# Patient Record
Sex: MALE | Race: BLACK OR AFRICAN AMERICAN | NOT HISPANIC OR LATINO | Employment: UNEMPLOYED | URBAN - METROPOLITAN AREA
[De-identification: names, ages, dates, MRNs, and addresses within clinical notes are randomized per-mention and may not be internally consistent; named-entity substitution may affect disease eponyms.]

---

## 2021-08-02 ENCOUNTER — HISTORICAL (OUTPATIENT)
Dept: ADMINISTRATIVE | Facility: HOSPITAL | Age: 36
End: 2021-08-02

## 2021-08-02 LAB
HBV SURFACE AB SER-ACNC: 94.1 MIU/ML
HBV SURFACE AB SERPL IA-ACNC: REACTIVE M[IU]/ML
HBV SURFACE AG SERPL QL IA: NONREACTIVE
HCV AB SERPL QL IA: NONREACTIVE
HIV 1+2 AB+HIV1 P24 AG SERPL QL IA: REACTIVE

## 2021-08-12 ENCOUNTER — HISTORICAL (OUTPATIENT)
Dept: WOUND CARE | Facility: HOSPITAL | Age: 36
End: 2021-08-12

## 2021-10-05 ENCOUNTER — HISTORICAL (OUTPATIENT)
Dept: ADMINISTRATIVE | Facility: HOSPITAL | Age: 36
End: 2021-10-05

## 2021-10-05 LAB
ABS NEUT (OLG): 2.32 X10(3)/MCL (ref 2.1–9.2)
ALBUMIN SERPL-MCNC: 4.1 GM/DL (ref 3.5–5)
ALBUMIN/GLOB SERPL: 1.2 RATIO (ref 1.1–2)
ALP SERPL-CCNC: 96 UNIT/L (ref 40–150)
ALT SERPL-CCNC: 47 UNIT/L (ref 0–55)
AMPHET UR QL SCN: NEGATIVE
APPEARANCE, UA: CLEAR
AST SERPL-CCNC: 36 UNIT/L (ref 5–34)
BACTERIA #/AREA URNS AUTO: ABNORMAL /HPF
BARBITURATE SCN PRESENT UR: NEGATIVE
BASOPHILS # BLD AUTO: 0 X10(3)/MCL (ref 0–0.2)
BASOPHILS NFR BLD AUTO: 0 %
BENZODIAZ UR QL SCN: NEGATIVE
BILIRUB SERPL-MCNC: 0.2 MG/DL
BILIRUB UR QL STRIP: NEGATIVE
BILIRUBIN DIRECT+TOT PNL SERPL-MCNC: 0.1 MG/DL (ref 0–0.5)
BILIRUBIN DIRECT+TOT PNL SERPL-MCNC: 0.1 MG/DL (ref 0–0.8)
BUN SERPL-MCNC: 14.9 MG/DL (ref 8.9–20.6)
CALCIUM SERPL-MCNC: 9.9 MG/DL (ref 8.4–10.2)
CANNABINOIDS UR QL SCN: NEGATIVE
CD3+CD4+ CELLS # SPEC: 822 UNIT/L (ref 589–1505)
CD3+CD4+ CELLS NFR BLD: 47 % (ref 31–59)
CHLORIDE SERPL-SCNC: 107 MMOL/L (ref 98–107)
CHOLEST SERPL-MCNC: 217 MG/DL
CHOLEST/HDLC SERPL: 6 {RATIO} (ref 0–5)
CO2 SERPL-SCNC: 26 MMOL/L (ref 22–29)
COCAINE UR QL SCN: NEGATIVE
COLOR UR: NORMAL
CREAT SERPL-MCNC: 1.54 MG/DL (ref 0.73–1.18)
DEPRECATED CALCIDIOL+CALCIFEROL SERPL-MC: 20.6 NG/ML (ref 30–80)
EOSINOPHIL # BLD AUTO: 0.2 X10(3)/MCL (ref 0–0.9)
EOSINOPHIL NFR BLD AUTO: 5 %
ERYTHROCYTE [DISTWIDTH] IN BLOOD BY AUTOMATED COUNT: 12.9 % (ref 11.5–14.5)
EST. AVERAGE GLUCOSE BLD GHB EST-MCNC: 116.9 MG/DL
GLOBULIN SER-MCNC: 3.3 GM/DL (ref 2.4–3.5)
GLUCOSE (UA): NEGATIVE
GLUCOSE SERPL-MCNC: 97 MG/DL (ref 74–100)
HAV AB SER QL IA: REACTIVE
HAV IGM SERPL QL IA: NONREACTIVE
HBA1C MFR BLD: 5.7 %
HBV CORE AB SERPL QL IA: NONREACTIVE
HBV CORE IGM SERPL QL IA: NONREACTIVE
HBV SURFACE AB SER-ACNC: 61.24 MIU/ML
HBV SURFACE AB SERPL IA-ACNC: REACTIVE M[IU]/ML
HBV SURFACE AG SERPL QL IA: NONREACTIVE
HCT VFR BLD AUTO: 43.1 % (ref 40–51)
HCV AB SERPL QL IA: NONREACTIVE
HDLC SERPL-MCNC: 37 MG/DL (ref 35–60)
HGB BLD-MCNC: 14.7 GM/DL (ref 13.5–17.5)
HGB UR QL STRIP: NEGATIVE
HYALINE CASTS #/AREA URNS LPF: ABNORMAL /LPF
IMM GRANULOCYTES # BLD AUTO: 0.01 10*3/UL
IMM GRANULOCYTES NFR BLD AUTO: 0 %
KETONES UR QL STRIP: NEGATIVE
LDLC SERPL CALC-MCNC: 132 MG/DL (ref 50–140)
LEUKOCYTE ESTERASE UR QL STRIP: NEGATIVE
LYMPHOCYTES # BLD AUTO: 1.7 X10(3)/MCL (ref 0.6–4.6)
LYMPHOCYTES # BLD AUTO: NORMAL UNIT/L (ref 1260–5520)
LYMPHOCYTES NFR BLD AUTO: 35 %
LYMPHOCYTES NFR LN MANUAL: NORMAL % (ref 28–48)
MCH RBC QN AUTO: 30.7 PG (ref 26–34)
MCHC RBC AUTO-ENTMCNC: 34.1 GM/DL (ref 31–37)
MCV RBC AUTO: 90 FL (ref 80–100)
MONOCYTES # BLD AUTO: 0.6 X10(3)/MCL (ref 0.1–1.3)
MONOCYTES NFR BLD AUTO: 13 %
NEG CONT SPOT COUNT: NORMAL
NEUTROPHILS # BLD AUTO: 2.32 X10(3)/MCL (ref 2.1–9.2)
NEUTROPHILS NFR BLD AUTO: 47 %
NITRITE UR QL STRIP: NEGATIVE
NRBC BLD AUTO-RTO: 0 % (ref 0–0.2)
OPIATES UR QL SCN: NEGATIVE
PANEL A SPOT COUNT: 0
PANEL B SPOT COUNT: 0
PCP UR QL: NEGATIVE
PH UR STRIP.AUTO: 6.5 [PH] (ref 3–11)
PH UR STRIP: 6.5 [PH] (ref 4.5–8)
PLATELET # BLD AUTO: 273 X10(3)/MCL (ref 130–400)
PMV BLD AUTO: 10.2 FL (ref 7.4–10.4)
POS CONT SPOT COUNT: NORMAL
POTASSIUM SERPL-SCNC: 3.6 MMOL/L (ref 3.5–5.1)
PROT SERPL-MCNC: 7.4 GM/DL (ref 6.4–8.3)
PROT UR QL STRIP: NEGATIVE
RBC # BLD AUTO: 4.79 X10(6)/MCL (ref 4.5–5.9)
RBC #/AREA URNS AUTO: ABNORMAL /HPF
RPR SER QL: REACTIVE
SODIUM SERPL-SCNC: 139 MMOL/L (ref 136–145)
SP GR UR STRIP: 1.01 (ref 1–1.03)
SQUAMOUS #/AREA URNS LPF: ABNORMAL /LPF
T PALLIDUM AB SER QL: REACTIVE
T-SPOT.TB: NORMAL
TRIGL SERPL-MCNC: 238 MG/DL (ref 34–140)
TSH SERPL-ACNC: 1.76 UIU/ML (ref 0.35–4.94)
UROBILINOGEN UR STRIP-ACNC: NORMAL
VLDLC SERPL CALC-MCNC: 48 MG/DL
WBC # BLD AUTO: NORMAL /MM3 (ref 4500–11500)
WBC # SPEC AUTO: 5 X10(3)/MCL (ref 4.5–11)
WBC #/AREA URNS AUTO: ABNORMAL /HPF

## 2021-11-05 ENCOUNTER — HISTORICAL (OUTPATIENT)
Dept: ADMINISTRATIVE | Facility: HOSPITAL | Age: 36
End: 2021-11-05

## 2021-11-05 LAB
BUN SERPL-MCNC: 15.7 MG/DL (ref 8.9–20.6)
CALCIUM SERPL-MCNC: 9.8 MG/DL (ref 8.7–10.5)
CHLORIDE SERPL-SCNC: 108 MMOL/L (ref 98–107)
CHOLEST SERPL-MCNC: 224 MG/DL
CHOLEST/HDLC SERPL: 7 {RATIO} (ref 0–5)
CO2 SERPL-SCNC: 25 MMOL/L (ref 22–29)
CREAT SERPL-MCNC: 1.65 MG/DL (ref 0.73–1.18)
CREAT/UREA NIT SERPL: 10
GLUCOSE SERPL-MCNC: 94 MG/DL (ref 74–100)
HDLC SERPL-MCNC: 33 MG/DL (ref 35–60)
LDLC SERPL CALC-MCNC: 136 MG/DL (ref 50–140)
POTASSIUM SERPL-SCNC: 4.2 MMOL/L (ref 3.5–5.1)
SODIUM SERPL-SCNC: 141 MMOL/L (ref 136–145)
TRIGL SERPL-MCNC: 273 MG/DL (ref 34–140)
VLDLC SERPL CALC-MCNC: 55 MG/DL

## 2021-11-23 ENCOUNTER — HISTORICAL (OUTPATIENT)
Dept: ADMINISTRATIVE | Facility: HOSPITAL | Age: 36
End: 2021-11-23

## 2021-11-23 ENCOUNTER — HISTORICAL (OUTPATIENT)
Dept: LAB | Facility: HOSPITAL | Age: 36
End: 2021-11-23

## 2021-11-23 LAB
BUN SERPL-MCNC: 15.1 MG/DL (ref 8.9–20.6)
CALCIUM SERPL-MCNC: 9.8 MG/DL (ref 8.7–10.5)
CHLORIDE SERPL-SCNC: 107 MMOL/L (ref 98–107)
CO2 SERPL-SCNC: 26 MMOL/L (ref 22–29)
CREAT SERPL-MCNC: 1.82 MG/DL (ref 0.73–1.18)
CREAT/UREA NIT SERPL: 8
GLUCOSE SERPL-MCNC: 78 MG/DL (ref 74–100)
POTASSIUM SERPL-SCNC: 3.9 MMOL/L (ref 3.5–5.1)
SODIUM SERPL-SCNC: 142 MMOL/L (ref 136–145)

## 2021-12-30 ENCOUNTER — HISTORICAL (OUTPATIENT)
Dept: ADMINISTRATIVE | Facility: HOSPITAL | Age: 36
End: 2021-12-30

## 2021-12-30 LAB
ABS NEUT (OLG): 2.25 X10(3)/MCL (ref 2.1–9.2)
BASOPHILS # BLD AUTO: 0 X10(3)/MCL (ref 0–0.2)
BASOPHILS NFR BLD AUTO: 0 %
BUN SERPL-MCNC: 15.3 MG/DL (ref 8.9–20.6)
CALCIUM SERPL-MCNC: 9.4 MG/DL (ref 8.7–10.5)
CHLORIDE SERPL-SCNC: 108 MMOL/L (ref 98–107)
CO2 SERPL-SCNC: 24 MMOL/L (ref 22–29)
CREAT SERPL-MCNC: 1.67 MG/DL (ref 0.73–1.18)
CREAT/UREA NIT SERPL: 9
EOSINOPHIL # BLD AUTO: 0 X10(3)/MCL (ref 0–0.9)
EOSINOPHIL NFR BLD AUTO: 0 %
ERYTHROCYTE [DISTWIDTH] IN BLOOD BY AUTOMATED COUNT: 13.5 % (ref 11.5–14.5)
GLUCOSE SERPL-MCNC: 78 MG/DL (ref 74–100)
HCT VFR BLD AUTO: 44.2 % (ref 40–51)
HGB BLD-MCNC: 14.8 GM/DL (ref 13.5–17.5)
IMM GRANULOCYTES # BLD AUTO: 0.02 10*3/UL
IMM GRANULOCYTES NFR BLD AUTO: 0 %
LYMPHOCYTES # BLD AUTO: 2.1 X10(3)/MCL (ref 0.6–4.6)
LYMPHOCYTES NFR BLD AUTO: 39 %
MCH RBC QN AUTO: 30.7 PG (ref 26–34)
MCHC RBC AUTO-ENTMCNC: 33.5 GM/DL (ref 31–37)
MCV RBC AUTO: 91.7 FL (ref 80–100)
MONOCYTES # BLD AUTO: 1.1 X10(3)/MCL (ref 0.1–1.3)
MONOCYTES NFR BLD AUTO: 20 %
NEUTROPHILS # BLD AUTO: 2.25 X10(3)/MCL (ref 2.1–9.2)
NEUTROPHILS NFR BLD AUTO: 41 %
NRBC BLD AUTO-RTO: 0 % (ref 0–0.2)
PLATELET # BLD AUTO: 259 X10(3)/MCL (ref 130–400)
PMV BLD AUTO: 10.4 FL (ref 7.4–10.4)
POTASSIUM SERPL-SCNC: 3.8 MMOL/L (ref 3.5–5.1)
RBC # BLD AUTO: 4.82 X10(6)/MCL (ref 4.5–5.9)
SODIUM SERPL-SCNC: 141 MMOL/L (ref 136–145)
WBC # SPEC AUTO: 5.5 X10(3)/MCL (ref 4.5–11)

## 2022-01-18 ENCOUNTER — HISTORICAL (OUTPATIENT)
Dept: RADIOLOGY | Facility: HOSPITAL | Age: 37
End: 2022-01-18

## 2022-01-26 ENCOUNTER — HISTORICAL (OUTPATIENT)
Dept: SURGERY | Facility: HOSPITAL | Age: 37
End: 2022-01-26

## 2022-04-11 ENCOUNTER — HISTORICAL (OUTPATIENT)
Dept: ADMINISTRATIVE | Facility: HOSPITAL | Age: 37
End: 2022-04-11

## 2022-04-27 VITALS
HEIGHT: 73 IN | OXYGEN SATURATION: 100 % | DIASTOLIC BLOOD PRESSURE: 92 MMHG | BODY MASS INDEX: 33.75 KG/M2 | WEIGHT: 254.63 LBS | SYSTOLIC BLOOD PRESSURE: 142 MMHG

## 2022-05-05 NOTE — HISTORICAL [facility]
This is a historical note converted from [employer]. Formatting and pictures may have been removed.  Please reference [employer] for original formatting and attached multimedia. Chief Complaint  Abscess to R upper back  History of Present Illness  36 y/o male with a back?lump ?x 3 months, causing pain with positions when he sleep, getting bigger. Pt is at the [facility]. PMHx HIV. At one point?it ?will need? to be remove. No abscess.  Review of Systems  see nurse notes  Physical Exam  Vitals & Measurements  T:?36.8? ?C (Oral)? HR:?92(Peripheral)? RR:?18? BP:?132/86? SpO2:?99%?  BMI:?27.33?  Back Big lump ,look like a lipoma.  Assessment/Plan  Lipoma of back?D17.1  ?Incision/Wounds  ?1. Other: Right Upper Back Lipoma?- last charted: 08/12/2021 14:00  ?? ??Assessment Done By?- Wound Care Team  ?? ??Abnormality Pattern?- Raised  ?? ??Pressure Point?- None  ?? ??Dressing Type?- None  ?? ??Cleansing?- Cleaned with normal saline  ?? ??Length?- 11.5 cm  ?? ??Width?- 13.5 cm  ?? ??Exudate Amount?- None  ?? ??Edge?- Approximated  ?? ??Surrounding Tissue Color?- Normal  ?? ??Surrounding Tissue Condition?- Intact  ?Pt examined and notes review above. The mass look like a lipoma. We explain to the patient that probably need to be removed and if the lipoma get bigger or cause more problems or get infected to let us know but as now?the nurse at the [facility] ?will continue observing the lump. RTC as needed.  Ordered:  Wound Care Team Treatment, 08/12/21 14:17:00 CDT, Stop date 08/12/21 14:17:00 CDT, RTC if needed. Pt to f/u with the nurse at the [facility] .  ?   Problem List/Past Medical History  Ongoing  No qualifying data  Historical  No qualifying data  Medications  Keflex 500 mg oral capsule, See Instructions  Triumeq oral tablet, 1 tab(s), Oral, Daily  Tylenol Caplet 500 mg oral tablet, See Instructions  Allergies  codeine?(Hives)  sulfa drugs?(Hives)  Health Maintenance  Health Maintenance  ???Pending?(in the next year)  ???  ??OverDue  ??? ? ? ?Alcohol Misuse Screening due??01/02/21??and every 1??year(s)  ??? ??Due?  ??? ? ? ?ADL Screening due??08/12/21??and every 1??year(s)  ??? ? ? ?Tetanus Vaccine due??08/12/21??and every 10??year(s)  ??? ??Due In Future?  ??? ? ? ?Obesity Screening not due until??01/01/22??and every 1??year(s)  ???Satisfied?(in the past 1 year)  ??? ??Satisfied?  ??? ? ? ?Blood Pressure Screening on??08/12/21.??Satisfied by SANTHOSH Gonsalez Shawndala  ??? ? ? ?Body Mass Index Check on??08/12/21.??Satisfied by SANTHOSH Gonsalez Shawndala  ??? ? ? ?Obesity Screening on??08/12/21.??Satisfied by SANTHOSH Gonsalez Shawndala  ?

## 2022-05-05 NOTE — HISTORICAL [facility] C[facility]
This is a historical note converted from [employer]. Formatting and pictures may have been removed.  Please reference [employer] for original formatting and attached multimedia. Chief Complaint  Referral for lipoma/cyst to back has increased in size and causes pain  History of Present Illness  Mr. Morales is a 35 yo male w PMH HIV+, asthma, CKD II. He presents today for removal of?a lipoma?on right posterior neck/upper back?since July 2021 that has progressively increased in size. Pt previously described pain that shoots down his arm and pain that shoots up his neck causing headaches but reports no such symptoms today/recently. He does feel discomfort when changing position in bed at night and when?sleeping on his right arm. He denies changes in vision, nausea, fever, chills, SOB, weight loss. MRI 1/18 shows a lipomatous mass not involving any nerves or muscle. NPO since midnight, not on any blood thinners  Physical Exam  Vitals & Measurements  T:?36.5? ?C (Oral)? HR:?67(Peripheral)? RR:?20? BP:?121/73? SpO2:?99%? WT:?115.2?kg? BMI:?32.7?  General:?well-developed well-nourished in no acute distress  Neck: 12 cm mass on right posterior neck/upper back. Mobile and rubbery mass well circumscribed. No pain on palpation.  Musculoskeletal:?full range of motion of all extremities/spine without limitation or discomfort  Integumentary: no rashes or skin lesions present  Neurologic: cranial nerves intact, no signs of peripheral neurological deficit, motor/sensory function intact  Assessment/Plan  Mr. Morales is a 35 yo male w PMH HIV+, asthma, CKD II. He?is scheduled for removal of?right posterior neck/upper back mass today.  - risks benefits alternatives explained and discussed in detail. all questions answered, pt amenable to proceed, consent signed  ?   Samira Diaz, PGY1  ?   Pt seen in [place] and exhibits a large subcutaneous mass  on posterior neck consistent with lipoma.  Pt aware of planned procedure.  ?   Jeromy Tavares,  MD   Problem List/Past Medical History  Ongoing  HIV disease  Obesity  Historical  No qualifying data  Procedure/Surgical History  Ureter reconstruction (2011)   Medications  Inpatient  cefazolin 2gm/50ml D5W (Premix)  Home  cholecalciferol 2000 intl units (50 mcg) oral capsule, 50 mcg= 1 cap(s), Oral, Daily  Qvar Redihaler 40 mcg/inh inhalation aerosol, 1 puff(s), INH, BID, PRN,? ?Not Taking, Completed Rx  Triumeq oral tablet, 1 tab(s), Oral, Daily, 3 refills  Zyrtec 10 mg oral tablet, 10 mg= 1 tab(s), Oral, Daily  Allergies  Pineapple?(Asthma)  codeine?(Hives)  sulfa drugs?(Hives)  Social History  Abuse/Neglect  No, No, Yes, 01/26/2022  No, 12/30/2021  Alcohol  Past, Beer, Wine, Liquor, 11/05/2021  Substance Use  Past, Cocaine, Marijuana, 11/05/2021  Tobacco  Former smoker, quit more than 30 days ago, No, 01/26/2022  Former smoker, quit more than 30 days ago, No, 12/30/2021  Family History  Asthma.: Father.  Hypertension.: Father.  Thyroid disease: Mother.  Brother: History is unknown  Sister: History is unknown  Immunizations  Vaccine Date Status   tetanus/diphtheria/pertussis, acel(Tdap) 11/05/2021 Given   influenza virus vaccine, inactivated 11/05/2021 Given   hepatitis B vaccine 10/05/2021 Recorded   hepatitis A adult vaccine 10/05/2021 Recorded   COVID-19, vector nr, rS-Ad26 - [facility] 07/18/2021 Recorded

## 2022-05-14 NOTE — OP NOTE
Indication for Surgery  35 yo male w PMH HIV+, asthma, CKD II who presented with a bothersome?right posterior neck/upper back mass and was scheduled for excision.  Preoperative Diagnosis  right posterior neck/upper back mass  Postoperative Diagnosis  same  Operation  excision of right posterior neck/upper back mass  Surgeon(s)  Staff: Jeromy Tavares MD  ?   Candice Osborne MD  Assistant  Bill Kellogg, MS3  Anesthesia  General  Estimated Blood Loss  10cc  Findings  mass size about [id number]  Specimen(s)  mass  Complications  none  Technique  Patient was brought to the operating room and placed in supine position.? General anesthesia was induced and patient was intubated.? He tolerated this well.? Patient was then placed in prone position on the operating room table.? All pressure areas were protected.? Posterior neck/upper back?was prepped and draped in sterile fashion.? A timeout was performed correctly identifying patient?and procedure to be performed.? An elliptical incision was then performed?on the skin?try to protect?his previous tattoos.? Subcutaneous?skin flaps were then?performed?circumferentially around the mass.? Using Bovie electrocautery?the mass was dissected and freed up from all the subcutaneous tissues around it.? The mass appeared to be?fatty?with multiple lobules.? We dissected up to the muscle fascia.? The mass was then?excised using Bovie electrocautery. ?It measured about?9?x 8 x 5 cm.? The mass was then?tagged with silk sutures?long lateral and so a short superior.? The cavity was then irrigated.? Hemostasis was achieved. ?3-0 Vicryl sutures were then placed to approximate the deep tissues?in order to prevent?seroma. ?Deep dermals were then placed?with 3-0 Vicryl sutures.? Marcaine?local anesthetic was placed?in the subcutaneous?tissues.? Skin was then closed with?2-0 nylon sutures in mattress interrupted?fashion. ?A sterile dressing was then placed.? All counts were correct.? Patient  was awoken from anesthesia?and extubated.? He tolerated well the procedure.? Dr. Tavares was present for the procedure.  ?  Candice Osborne MD  [facility] General Surgery PGY-3  ?   This certifies I was present during the entire period between opening and closing of the surgical field.  ?   Jeromy Tavares MD

## 2023-11-13 ENCOUNTER — TELEPHONE (OUTPATIENT)
Dept: INFECTIOUS DISEASES | Facility: CLINIC | Age: 38
End: 2023-11-13
Payer: COMMERCIAL

## 2023-11-13 ENCOUNTER — OFFICE VISIT (OUTPATIENT)
Dept: INFECTIOUS DISEASES | Facility: CLINIC | Age: 38
End: 2023-11-13
Payer: COMMERCIAL

## 2023-11-13 VITALS
RESPIRATION RATE: 16 BRPM | TEMPERATURE: 98 F | BODY MASS INDEX: 36.98 KG/M2 | HEIGHT: 73 IN | HEART RATE: 83 BPM | WEIGHT: 279 LBS | DIASTOLIC BLOOD PRESSURE: 78 MMHG | SYSTOLIC BLOOD PRESSURE: 131 MMHG

## 2023-11-13 DIAGNOSIS — Z11.3 ROUTINE SCREENING FOR STI (SEXUALLY TRANSMITTED INFECTION): ICD-10-CM

## 2023-11-13 DIAGNOSIS — E55.9 VITAMIN D DEFICIENCY: ICD-10-CM

## 2023-11-13 DIAGNOSIS — Z23 IMMUNIZATION DUE: ICD-10-CM

## 2023-11-13 DIAGNOSIS — E78.5 HYPERLIPIDEMIA, UNSPECIFIED HYPERLIPIDEMIA TYPE: ICD-10-CM

## 2023-11-13 DIAGNOSIS — B20 HIV DISEASE: Primary | ICD-10-CM

## 2023-11-13 DIAGNOSIS — A53.9 SYPHILIS: ICD-10-CM

## 2023-11-13 DIAGNOSIS — Z86.19 HISTORY OF SYPHILIS: ICD-10-CM

## 2023-11-13 LAB
ALBUMIN SERPL-MCNC: 4.3 G/DL (ref 3.5–5)
ALBUMIN/GLOB SERPL: 1.2 RATIO (ref 1.1–2)
ALP SERPL-CCNC: 92 UNIT/L (ref 40–150)
ALT SERPL-CCNC: 53 UNIT/L (ref 0–55)
APPEARANCE UR: CLEAR
AST SERPL-CCNC: 32 UNIT/L (ref 5–34)
BACTERIA #/AREA URNS AUTO: ABNORMAL /HPF
BASOPHILS # BLD AUTO: 0.02 X10(3)/MCL
BASOPHILS NFR BLD AUTO: 0.3 %
BILIRUB SERPL-MCNC: 0.3 MG/DL
BILIRUB UR QL STRIP.AUTO: NEGATIVE
BUN SERPL-MCNC: 12.9 MG/DL (ref 8.9–20.6)
C TRACH DNA SPEC QL NAA+PROBE: NOT DETECTED
C TRACH DNA SPEC QL NAA+PROBE: NOT DETECTED
CALCIUM SERPL-MCNC: 9.5 MG/DL (ref 8.4–10.2)
CHLORIDE SERPL-SCNC: 104 MMOL/L (ref 98–107)
CHOLEST SERPL-MCNC: 224 MG/DL
CHOLEST/HDLC SERPL: 7 {RATIO} (ref 0–5)
CO2 SERPL-SCNC: 25 MMOL/L (ref 22–29)
COLOR UR AUTO: COLORLESS
CREAT SERPL-MCNC: 1.54 MG/DL (ref 0.73–1.18)
DEPRECATED CALCIDIOL+CALCIFEROL SERPL-MC: 37.6 NG/ML (ref 30–80)
EOSINOPHIL # BLD AUTO: 0.1 X10(3)/MCL (ref 0–0.9)
EOSINOPHIL NFR BLD AUTO: 1.7 %
ERYTHROCYTE [DISTWIDTH] IN BLOOD BY AUTOMATED COUNT: 12.2 % (ref 11.5–17)
GFR SERPLBLD CREATININE-BSD FMLA CKD-EPI: 58 MLS/MIN/1.73/M2
GLOBULIN SER-MCNC: 3.5 GM/DL (ref 2.4–3.5)
GLUCOSE SERPL-MCNC: 78 MG/DL (ref 74–100)
GLUCOSE UR QL STRIP.AUTO: NORMAL
HAV IGM SERPL QL IA: NONREACTIVE
HBV CORE IGM SERPL QL IA: NONREACTIVE
HBV SURFACE AG SERPL QL IA: NONREACTIVE
HCT VFR BLD AUTO: 48.7 % (ref 42–52)
HCV AB SERPL QL IA: NONREACTIVE
HDLC SERPL-MCNC: 32 MG/DL (ref 35–60)
HGB BLD-MCNC: 16.3 G/DL (ref 14–18)
HYALINE CASTS #/AREA URNS LPF: ABNORMAL /LPF
IMM GRANULOCYTES # BLD AUTO: 0.01 X10(3)/MCL (ref 0–0.04)
IMM GRANULOCYTES NFR BLD AUTO: 0.2 %
KETONES UR QL STRIP.AUTO: NEGATIVE
LDLC SERPL CALC-MCNC: 141 MG/DL (ref 50–140)
LEUKOCYTE ESTERASE UR QL STRIP.AUTO: NEGATIVE
LYMPHOCYTES # BLD AUTO: 2.31 X10(3)/MCL (ref 0.6–4.6)
LYMPHOCYTES NFR BLD AUTO: 39 %
MCH RBC QN AUTO: 30.4 PG (ref 27–31)
MCHC RBC AUTO-ENTMCNC: 33.5 G/DL (ref 33–36)
MCV RBC AUTO: 90.9 FL (ref 80–94)
MONOCYTES # BLD AUTO: 0.64 X10(3)/MCL (ref 0.1–1.3)
MONOCYTES NFR BLD AUTO: 10.8 %
N GONORRHOEA DNA SPEC QL NAA+PROBE: NOT DETECTED
N GONORRHOEA DNA SPEC QL NAA+PROBE: NOT DETECTED
NEUTROPHILS # BLD AUTO: 2.85 X10(3)/MCL (ref 2.1–9.2)
NEUTROPHILS NFR BLD AUTO: 48 %
NITRITE UR QL STRIP.AUTO: NEGATIVE
NRBC BLD AUTO-RTO: 0 %
PH UR STRIP.AUTO: 6.5 [PH]
PLATELET # BLD AUTO: 294 X10(3)/MCL (ref 130–400)
PMV BLD AUTO: 10.4 FL (ref 7.4–10.4)
POTASSIUM SERPL-SCNC: 3.9 MMOL/L (ref 3.5–5.1)
PROT SERPL-MCNC: 7.8 GM/DL (ref 6.4–8.3)
PROT UR QL STRIP.AUTO: NEGATIVE
RBC # BLD AUTO: 5.36 X10(6)/MCL (ref 4.7–6.1)
RBC #/AREA URNS AUTO: ABNORMAL /HPF
RBC UR QL AUTO: NEGATIVE
RPR SER QL: REACTIVE
RPR SER-TITR: ABNORMAL {TITER}
SODIUM SERPL-SCNC: 140 MMOL/L (ref 136–145)
SOURCE (OHS): NORMAL
SOURCE (OHS): NORMAL
SP GR UR STRIP.AUTO: 1.01 (ref 1–1.03)
SQUAMOUS #/AREA URNS LPF: ABNORMAL /HPF
T PALLIDUM AB SER QL: REACTIVE
TRIGL SERPL-MCNC: 254 MG/DL (ref 34–140)
TSH SERPL-ACNC: 1.66 UIU/ML (ref 0.35–4.94)
UROBILINOGEN UR STRIP-ACNC: NORMAL
VLDLC SERPL CALC-MCNC: 51 MG/DL
WBC # SPEC AUTO: 5.93 X10(3)/MCL (ref 4.5–11.5)
WBC #/AREA URNS AUTO: ABNORMAL /HPF

## 2023-11-13 PROCEDURE — 86360 T CELL ABSOLUTE COUNT/RATIO: CPT | Performed by: NURSE PRACTITIONER

## 2023-11-13 PROCEDURE — 36415 COLL VENOUS BLD VENIPUNCTURE: CPT | Performed by: NURSE PRACTITIONER

## 2023-11-13 PROCEDURE — 99214 OFFICE O/P EST MOD 30 MIN: CPT | Mod: PBBFAC | Performed by: NURSE PRACTITIONER

## 2023-11-13 PROCEDURE — 99214 OFFICE O/P EST MOD 30 MIN: CPT | Mod: S$PBB,,, | Performed by: NURSE PRACTITIONER

## 2023-11-13 PROCEDURE — 87491 CHLMYD TRACH DNA AMP PROBE: CPT | Performed by: NURSE PRACTITIONER

## 2023-11-13 PROCEDURE — 80053 COMPREHEN METABOLIC PANEL: CPT | Performed by: NURSE PRACTITIONER

## 2023-11-13 PROCEDURE — 82306 VITAMIN D 25 HYDROXY: CPT | Performed by: NURSE PRACTITIONER

## 2023-11-13 PROCEDURE — 90472 IMMUNIZATION ADMIN EACH ADD: CPT | Mod: PBBFAC

## 2023-11-13 PROCEDURE — 90677 PCV20 VACCINE IM: CPT | Mod: PBBFAC

## 2023-11-13 PROCEDURE — 85025 COMPLETE CBC W/AUTO DIFF WBC: CPT | Performed by: NURSE PRACTITIONER

## 2023-11-13 PROCEDURE — 86592 SYPHILIS TEST NON-TREP QUAL: CPT | Performed by: NURSE PRACTITIONER

## 2023-11-13 PROCEDURE — 99214 PR OFFICE/OUTPT VISIT, EST, LEVL IV, 30-39 MIN: ICD-10-PCS | Mod: S$PBB,,, | Performed by: NURSE PRACTITIONER

## 2023-11-13 PROCEDURE — 90686 IIV4 VACC NO PRSV 0.5 ML IM: CPT | Mod: PBBFAC

## 2023-11-13 PROCEDURE — 87591 N.GONORRHOEAE DNA AMP PROB: CPT | Performed by: NURSE PRACTITIONER

## 2023-11-13 PROCEDURE — 84443 ASSAY THYROID STIM HORMONE: CPT | Performed by: NURSE PRACTITIONER

## 2023-11-13 PROCEDURE — 86480 TB TEST CELL IMMUN MEASURE: CPT | Performed by: NURSE PRACTITIONER

## 2023-11-13 PROCEDURE — 87536 HIV-1 QUANT&REVRSE TRNSCRPJ: CPT | Performed by: NURSE PRACTITIONER

## 2023-11-13 PROCEDURE — 86780 TREPONEMA PALLIDUM: CPT | Performed by: NURSE PRACTITIONER

## 2023-11-13 PROCEDURE — 80061 LIPID PANEL: CPT | Performed by: NURSE PRACTITIONER

## 2023-11-13 PROCEDURE — 90471 IMMUNIZATION ADMIN: CPT | Mod: PBBFAC

## 2023-11-13 PROCEDURE — 87591 N.GONORRHOEAE DNA AMP PROB: CPT | Mod: 91 | Performed by: NURSE PRACTITIONER

## 2023-11-13 PROCEDURE — 81001 URINALYSIS AUTO W/SCOPE: CPT | Performed by: NURSE PRACTITIONER

## 2023-11-13 PROCEDURE — 87491 CHLMYD TRACH DNA AMP PROBE: CPT | Mod: 91 | Performed by: NURSE PRACTITIONER

## 2023-11-13 PROCEDURE — 80074 ACUTE HEPATITIS PANEL: CPT | Performed by: NURSE PRACTITIONER

## 2023-11-13 RX ORDER — ABACAVIR SULFATE, DOLUTEGRAVIR SODIUM, LAMIVUDINE 600; 50; 300 MG/1; MG/1; MG/1
1 TABLET, FILM COATED ORAL NIGHTLY
Qty: 30 TABLET | Refills: 4 | Status: SHIPPED | OUTPATIENT
Start: 2023-11-13

## 2023-11-13 RX ORDER — DOXYCYCLINE HYCLATE 100 MG
100 TABLET ORAL 2 TIMES DAILY
Qty: 56 TABLET | Refills: 0 | Status: SHIPPED | OUTPATIENT
Start: 2023-11-13 | End: 2023-12-11

## 2023-11-13 RX ORDER — PRAVASTATIN SODIUM 40 MG/1
40 TABLET ORAL DAILY
Qty: 90 TABLET | Refills: 1 | Status: SHIPPED | OUTPATIENT
Start: 2023-11-13 | End: 2024-11-12

## 2023-11-13 RX ORDER — ERGOCALCIFEROL 1.25 MG/1
50000 CAPSULE ORAL
Qty: 12 CAPSULE | Refills: 1 | Status: SHIPPED | OUTPATIENT
Start: 2023-11-13

## 2023-11-13 RX ADMIN — PNEUMOCOCCAL 20-VALENT CONJUGATE VACCINE 0.5 ML
2.2; 2.2; 2.2; 2.2; 2.2; 2.2; 2.2; 2.2; 2.2; 2.2; 2.2; 2.2; 2.2; 2.2; 2.2; 2.2; 4.4; 2.2; 2.2; 2.2 INJECTION, SUSPENSION INTRAMUSCULAR at 09:11

## 2023-11-13 RX ADMIN — INFLUENZA VIRUS VACCINE 0.5 ML: 15; 15; 15; 15 SUSPENSION INTRAMUSCULAR at 09:11

## 2023-11-13 NOTE — PROGRESS NOTES
Reviewed labs. RPR 16 dils, Chol 224 and . Pt is a inmate at [facility].  He will need to be retreated for syphilis with Doxycycline 100 mg 1 po BID x 28 days and he will need to start Pravastatin 40 mg 1 po q day for elevated cholesterol and TG as well as follow a low cholesterol diet. I will print rx for you to send.  Please notify medical or [place].

## 2023-11-13 NOTE — PROGRESS NOTES
Patient ID: Dionisio Morales 38 y.o.     Chief Complaint:   Chief Complaint   Patient presents with    Follow-up     B20        HPI:    11/13/23  Dionisio Morales is a 37 y/o A[facility] inmate from [facility] here for HIV f/u. Pt is in [facility] and is accompanied by a guard.  Dx 2014, MSM, insertive partner.  Last visit was 3/22/22.  Pt reports adherence to Triumeq, but tells me he has been off of his medications for approx 3 weeks due to needing an updated rx.  Pt denies fever, headache, chills, visual problems, N/V/D, SOB, cough, chest pain or abd pain. Reviewed labs from 10/5/21 HIV 4th gen +, CD4 822, Hep A IGG +, Hep B s ab +.  BMI 36. Hx of syphilis. Recd Bicillin LA 2.4 million units weekly x 3 weeks 10/7/21, 10/14/21, 10/21/21.  Pt will need updated labs.  HIV fundus photo 11/6/21. Anal pap smear neg 11/5/21. Pt tells me he has not been sexual active in approx 27 months. He is due for a flu and PCV20 vaccine. Pt is amenable.    3/22/22   Dionisio Morales is a 37 y/o AAM inmate from [facility] here for HIV f/u.  Pt is accompanied by a guard in [facility]. Pt tells me he is expected to be released around 5/2022 and plans to return home to [place].  Dx 2014. MSM, top. Pt reports 100% adherence to Triumeq.  He tells me this is the only regimen he has ever been prescribed. Pt denies fever, chills, headaches, visual problems, N/V/D, SOB, cough, chest or abdominal pain. Reviewed labs from 10/5/21 HIV VL <20, Cd4 822, RPR 8 dils. Hep A IGG positive, Hep B surface ab positive, Hep C VL ND, Chol 224,. Last creatinine 1.67 on 12/30/21. Blood pressure is elevated today. Pt tells me his diet is high in salt and he has gained about 50lbs since being incarcerated. Pt will need to be placed on a low sodium diet. He thinks the blood pressure is elevatd today, because he had a tooth pulled the other day and it is hurting a lot.  Hx of syphilis. Pt recd Bicillin LA 2.4 million units weekly x 3 weeks 10/7/21, 10/14/21,  10/21/21.  RPR will need to be repeated. HIV fundus photo 11/6/21. Anal pap smear neg 11/5/21. Pt is due for a PCV13 and Menveo vaccine today. He is amenable.  Lipoma removal done 1/26/22.     11/5/21  Dionisio Morales is a 35 y/o A[facility] inmate here for initial HIV visit. Pt is accompanied by a guard in [facility]. Dx 2014. Referred to clinic by JOSE ELIAS Jeter< NP @ [facility]. Pt is currently incarcerated at [facility]. He is from [facility] he was here for one day before getting arrested.  MSM, top. Current ART regimen is Triumeq. Tells me he was off of his medications from February through July, but has been taking them consistently since restarting.   Reviewed labs from 10/5/21 HIV VL <20, Cd4 822, creatinine 1.54, Vit D 20.6, creatinine 1.54, Chol 217, , Hep A IGG +, Hep B s ab +, RPR 8 dils, urine Ct/GC neg, Hep C VL ND, t spot neg. Pt recd Bicillin LA 2.4 million units weekly x 3 weeks 10/7/21, 10/14/21, 10/21/21.  He is due for a HIV fundus photo, anal pap smear along with STI screening, flu and Tdap. Pt is amenable.  Denies fever, chills, headaches, visual problems, N/V/D, SOB, cough or abdominal pain. Complains of a large cyst/lipoma to the back just below the neck that is growing. He states it feels like it has fluid in it and really bothers him. Pt recd Janseen vaccine. He will need a Moderna booster. [1]         Past Medical History:   Diagnosis Date    HIV infection         History reviewed. No pertinent surgical history.     Social History     Socioeconomic History    Marital status: Single   Tobacco Use    Smoking status: Former     Types: Cigarettes    Smokeless tobacco: Never   Substance and Sexual Activity    Alcohol use: Not Currently    Drug use: Not Currently     Types: Marijuana    Sexual activity: Not Currently     Partners: Male        Family History   Problem Relation Age of Onset    Diabetes Mother     Thyroid disease Mother     Hypotension Mother     Diabetes Father     Hypertension Sister   "   Kidney disease Sister     Asthma Sister     No Known Problems Brother         Review of patient's allergies indicates:   Allergen Reactions    Codeine      Other reaction(s): Hives    Pineapple      Other reaction(s): Asthma        Immunization History   Administered Date(s) Administered    COVID-19, vector-nr, rS-Ad26, PF ([facility]) 07/18/2021    Hepatitis A, Adult 10/05/2021    Hepatitis B, Adult 10/05/2021    Influenza - Quadrivalent 11/05/2021    Influenza - Quadrivalent - PF *Preferred* (6 months and older) 11/13/2023    Meningococcal Conjugate (MCV4P) 03/22/2022    Pneumococcal Conjugate - 13 Valent 03/22/2022    Pneumococcal Conjugate - 20 Valent 11/13/2023    Tdap 11/05/2021        Review of Systems   Constitutional: Negative.    HENT: Negative.     Eyes: Negative.    Respiratory: Negative.     Cardiovascular: Negative.    Gastrointestinal: Negative.    Genitourinary: Negative.    Musculoskeletal: Negative.    Skin: Negative.    Neurological: Negative.    Endo/Heme/Allergies: Negative.    Psychiatric/Behavioral: Negative.     All other systems reviewed and are negative.         Objective:      /78   Pulse 83   Temp 98 °F (36.7 °C)   Resp 16   Ht 6' 1" (1.854 m)   Wt 126.6 kg (279 lb)   BMI 36.81 kg/m²      Physical Exam  Vitals reviewed.   Constitutional:       General: He is not in acute distress.     Appearance: Normal appearance. He is obese.   HENT:      Head: Normocephalic.      Right Ear: External ear normal.      Left Ear: External ear normal.      Nose: Nose normal.      Mouth/Throat:      Mouth: Mucous membranes are moist.      Pharynx: Oropharynx is clear.   Eyes:      General: No scleral icterus.     Extraocular Movements: Extraocular movements intact.      Conjunctiva/sclera: Conjunctivae normal.      Pupils: Pupils are equal, round, and reactive to light.   Cardiovascular:      Rate and Rhythm: Normal rate and regular rhythm.      Pulses: Normal pulses.      Heart sounds: Normal " heart sounds.   Pulmonary:      Effort: Pulmonary effort is normal. No respiratory distress.      Breath sounds: Normal breath sounds.   Abdominal:      General: Bowel sounds are normal. There is no distension.      Palpations: Abdomen is soft. There is no mass.      Tenderness: There is no abdominal tenderness. There is no right CVA tenderness or left CVA tenderness.      Hernia: No hernia is present.   Musculoskeletal:         General: No tenderness or signs of injury. Normal range of motion.      Cervical back: Normal range of motion and neck supple.      Right lower leg: No edema.      Left lower leg: No edema.   Lymphadenopathy:      Cervical: No cervical adenopathy.   Skin:     General: Skin is warm and dry.      Capillary Refill: Capillary refill takes less than 2 seconds.      Findings: No erythema or lesion.   Neurological:      General: No focal deficit present.      Mental Status: He is alert and oriented to person, place, and time. Mental status is at baseline.   Psychiatric:         Mood and Affect: Mood normal.         Behavior: Behavior normal.         Thought Content: Thought content normal.         Judgment: Judgment normal.          Labs:   Lab Results   Component Value Date    WBC 5.93 11/13/2023    HGB 16.3 11/13/2023    HCT 48.7 11/13/2023    MCV 90.9 11/13/2023     11/13/2023       CMP  Sodium Level   Date Value Ref Range Status   11/13/2023 140 136 - 145 mmol/L Final     Potassium Level   Date Value Ref Range Status   11/13/2023 3.9 3.5 - 5.1 mmol/L Final     Carbon Dioxide   Date Value Ref Range Status   11/13/2023 25 22 - 29 mmol/L Final     Blood Urea Nitrogen   Date Value Ref Range Status   11/13/2023 12.9 8.9 - 20.6 mg/dL Final     Creatinine   Date Value Ref Range Status   11/13/2023 1.54 (H) 0.73 - 1.18 mg/dL Final     Calcium Level Total   Date Value Ref Range Status   11/13/2023 9.5 8.4 - 10.2 mg/dL Final     Albumin Level   Date Value Ref Range Status   11/13/2023 4.3 3.5 -  5.0 g/dL Final     Bilirubin Total   Date Value Ref Range Status   11/13/2023 0.3 <=1.5 mg/dL Final     Alkaline Phosphatase   Date Value Ref Range Status   11/13/2023 92 40 - 150 unit/L Final     Aspartate Aminotransferase   Date Value Ref Range Status   11/13/2023 32 5 - 34 unit/L Final     Alanine Aminotransferase   Date Value Ref Range Status   11/13/2023 53 0 - 55 unit/L Final     eGFR   Date Value Ref Range Status   11/13/2023 58 mls/min/1.73/m2 Final     Lab Results   Component Value Date    TSH 1.663 11/13/2023     Hep C Ab Interp   Date Value Ref Range Status   11/13/2023 Nonreactive Nonreactive Final     Syphilis Antibody   Date Value Ref Range Status   11/13/2023 Reactive (A) Nonreactive, Equivocal Final     RPR   Date Value Ref Range Status   10/05/2021 Reactive (A) >Non-Reactive Final     Cholesterol Total   Date Value Ref Range Status   11/13/2023 224 (H) <=200 mg/dL Final     HDL Cholesterol   Date Value Ref Range Status   11/13/2023 32 (L) 35 - 60 mg/dL Final     Triglyceride   Date Value Ref Range Status   11/13/2023 254 (H) 34 - 140 mg/dL Final     Cholesterol/HDL Ratio   Date Value Ref Range Status   11/13/2023 7 (H) 0 - 5 Final     Very Low Density Lipoprotein   Date Value Ref Range Status   11/13/2023 51  Final     LDL Cholesterol   Date Value Ref Range Status   11/13/2023 141.00 (H) 50.00 - 140.00 mg/dL Final     Vit D 25 OH   Date Value Ref Range Status   11/13/2023 37.6 30.0 - 80.0 ng/mL Final     Results for orders placed or performed in visit on 10/05/21   CD4 Lymphocytes   Result Value Ref Range    Lymph Absolute N/A 1,260 - 5,520 unit/L    CD4 % 47.0 31.0 - 59.0 %    CD4 Absolute 822 589 - 1,505 unit/L    WBC Absolute N/A 4,500.0 - 11,500.0 /mm3    Lymph Percent N/A 28 - 48 %     No results found for this or any previous visit.  No results found for this or any previous visit.  No results found for this or any previous visit.  Results for orders placed or performed in visit on 11/13/23    Urinalysis   Result Value Ref Range    Color, UA Colorless (A) Yellow, Light-Yellow, Dark Yellow, Alissa, Straw    Appearance, UA Clear Clear    Specific Gravity, UA 1.010 1.005 - 1.030    pH, UA 6.5 5.0 - 8.5    Protein, UA Negative Negative    Glucose, UA Normal Negative, Normal    Ketones, UA Negative Negative    Blood, UA Negative Negative    Bilirubin, UA Negative Negative    Urobilinogen, UA Normal 0.2, 1.0, Normal    Nitrites, UA Negative Negative    Leukocyte Esterase, UA Negative Negative    WBC, UA None Seen None Seen, 0-2, 3-5, 0-5 /HPF    Bacteria, UA None Seen None Seen /HPF    Squamous Epithelial Cells, UA None Seen None Seen /HPF    Hyaline Casts, UA None Seen None Seen /lpf    RBC, UA None Seen None Seen, 0-2, 3-5, 0-5 /HPF       Imaging: Reviewed most recent relevant imaging studies available, notable results highlighted in this note    Medications:     Current Outpatient Medications   Medication Instructions    abacavir-dolutegravir-lamivud (TRIUMEQ) 600- mg Tab 1 tablet, Oral, Nightly    beclomethasone dipropionate (QVAR REDIHALER) 40 mcg/actuation HFAB Inhalation    cetirizine (ZYRTEC) 10 mg, Oral    ergocalciferol (VITAMIN D2) 50,000 Units, Oral, Every 7 days       Assessment:       Problem List Items Addressed This Visit    None  Visit Diagnoses       HIV disease    -  Primary    Relevant Medications    abacavir-dolutegravir-lamivud (TRIUMEQ) 600- mg Tab    Other Relevant Orders    Lipid Panel (Completed)    Quantiferon Gold TB    SYPHILIS ANTIBODY (WITH REFLEX RPR) (Completed)    TSH (Completed)    Urinalysis (Completed)    Chlamydia/GC, PCR (Completed)    Comprehensive Metabolic Panel (Completed)    CBC Auto Differential (Completed)    CD4 Lymphocytes    HIV-1 RNA, Quantitative, PCR with Reflex to Genotype    Hepatitis Panel, Acute (Completed)    Pathologist Interpretation    RPR    Vitamin D deficiency        Relevant Medications    ergocalciferol (VITAMIN D2) 50,000 unit Cap     Other Relevant Orders    Vitamin D (Completed)    History of syphilis        Relevant Orders    SYPHILIS ANTIBODY (WITH REFLEX RPR) (Completed)    RPR    Immunization due        Relevant Medications    influenza (QUADRIVALENT PF) vaccine 0.5 mL (Completed)    pneumoc 20-albina conj-dip cr(PF) (PREVNAR-20 (PF)) injection Syrg 0.5 mL (Completed)    Routine screening for STI (sexually transmitted infection)        Relevant Orders    C.trach/N.gonor AMP RNA    Chlamydia/GC, PCR (Completed)               Plan:      HIV disease  -     Lipid Panel; Future; Expected date: 11/13/2023  -     Quantiferon Gold TB; Future; Expected date: 11/13/2023  -     SYPHILIS ANTIBODY (WITH REFLEX RPR); Future; Expected date: 11/13/2023  -     TSH; Future; Expected date: 11/13/2023  -     Urinalysis; Future; Expected date: 11/13/2023  -     Cancel: Hepatitis C Antibody; Future; Expected date: 11/13/2023  -     Chlamydia/GC, PCR; Future; Expected date: 11/13/2023  -     Comprehensive Metabolic Panel; Future; Expected date: 11/13/2023  -     CBC Auto Differential; Future; Expected date: 11/13/2023  -     CD4 Lymphocytes; Future; Expected date: 11/13/2023  -     HIV-1 RNA, Quantitative, PCR with Reflex to Genotype; Future; Expected date: 11/13/2023  -     Hepatitis Panel, Acute; Future; Expected date: 11/13/2023  -     abacavir-dolutegravir-lamivud (TRIUMEQ) 600- mg Tab; Take 1 tablet by mouth every evening.  Dispense: 30 tablet; Refill: 4  -     Pathologist Interpretation  -     RPR  Adherence and sexual health counseling done.  Use condoms for all sexual encounters.  Blood precautions.   Continue Triumeq 1 po q day as prescribed.  Labs today.  RTC 4 months with Akiko for an in office visit     HIV Wellness:  Oral CT/GC: 11/13/23  Anal CT/GC: 11/13/23  Urine CT/GC: 11/13/23  RPR: 11/13/23  Ophth: Referred    Vitamin D deficiency  -     Vitamin D; Future; Expected date: 11/13/2023  -     ergocalciferol (VITAMIN D2) 50,000 unit Cap; Take 1  capsule (50,000 Units total) by mouth every 7 days.  Dispense: 12 capsule; Refill: 1  Continue meds. Pt educated on the importance of Vit D to bone and organ health.     History of syphilis  -     SYPHILIS ANTIBODY (WITH REFLEX RPR); Future; Expected date: 11/13/2023  -     RPR  Recd Bicillin LA 2.4 million units weekly x 3 weeks 10/7/21, 10/14/21, 10/21/21.    Immunization due  -     influenza (QUADRIVALENT PF) vaccine 0.5 mL  -     pneumoc 20-albina conj-dip cr(PF) (PREVNAR-20 (PF)) injection Syrg 0.5 mL    Routine screening for STI (sexually transmitted infection)  -     C.trach/N.gonor AMP RNA; Future; Expected date: 11/13/2023  -     Chlamydia/GC, PCR; Future; Expected date: 11/13/2023  Oral and rectal CT/GC self collected

## 2023-11-13 NOTE — PROGRESS NOTES
Patient ID: Dionisio Morales 38 y.o.     Chief Complaint:   Chief Complaint   Patient presents with    Follow-up     B20        HPI:    3/22/22   Dionisio Morales is a 37 y/o [facility] inmate from [facility] here for HIV f/u.  Pt is accompanied by a guard in [facility]. Pt tells me he is expected to be released around 5/2022 and plans to return home to [place].  Dx 2014. MSM, top. Pt reports 100% adherence to Triumeq.  He tells me this is the only regimen he has ever been prescribed. Pt denies fever, chills, headaches, visual problems, N/V/D, SOB, cough, chest or abdominal pain. Reviewed labs from 10/5/21 HIV VL <20, Cd4 822, RPR 8 dils. Hep A IGG positive, Hep B surface ab positive, Hep C VL ND, Chol 224,. Last creatinine 1.67 on 12/30/21. Blood pressure is elevated today. Pt tells me his diet is high in salt and he has gained about 50lbs since being incarcerated. Pt will need to be placed on a low sodium diet. He thinks the blood pressure is elevatd today, because he had a tooth pulled the other day and it is hurting a lot.  Hx of syphilis. Pt recd Bicillin LA 2.4 million units weekly x 3 weeks 10/7/21, 10/14/21, 10/21/21.  RPR will need to be repeated. HIV fundus photo 11/6/21. Anal pap smear neg 11/5/21. Pt is due for a PCV13 and Menveo vaccine today. He is amenable.  Lipoma removal done 1/26/22.     11/5/21  Dionisio Morales is a 37 y/o [employer] inmate here for initial HIV visit. Pt is accompanied by a guard in [facility]. Dx 2014. Referred to clinic by JOSE ELIAS Jeter< NP @ [facility]. Pt is currently incarcerated at [facility]. He is from [facility] he was here for one day before getting arrested.  MSM, top. Current ART regimen is Triumeq. Tells me he was off of his medications from February through July, but has been taking them consistently since restarting.   Reviewed labs from 10/5/21 HIV VL <20, Cd4 822, creatinine 1.54, Vit D 20.6, creatinine 1.54, Chol 217, , Hep A IGG +, Hep B s ab +, RPR 8 dils, urine  "Ct/GC neg, Hep C VL ND, t spot neg. Pt recd Bicillin LA 2.4 million units weekly x 3 weeks 10/7/21, 10/14/21, 10/21/21.  He is due for a HIV fundus photo, anal pap smear along with STI screening, flu and Tdap. Pt is amenable.  Denies fever, chills, headaches, visual problems, N/V/D, SOB, cough or abdominal pain. Complains of a large cyst/lipoma to the back just below the neck that is growing. He states it feels like it has fluid in it and really bothers him. Pt recd Janseen vaccine. He will need a Moderna booster. [1]         Past Medical History:   Diagnosis Date    HIV infection         History reviewed. No pertinent surgical history.     Social History     Socioeconomic History    Marital status: Single   Tobacco Use    Smoking status: Former     Types: Cigarettes    Smokeless tobacco: Never   Substance and Sexual Activity    Alcohol use: Not Currently    Drug use: Not Currently     Types: Marijuana    Sexual activity: Not Currently     Partners: Male        Family History   Problem Relation Age of Onset    Diabetes Mother     Thyroid disease Mother     Hypotension Mother     Diabetes Father     Hypertension Sister     Kidney disease Sister     Asthma Sister     No Known Problems Brother         Review of patient's allergies indicates:   Allergen Reactions    Codeine      Other reaction(s): Hives    Pineapple      Other reaction(s): Asthma        Immunization History   Administered Date(s) Administered    COVID-19, vector-nr, rS-Ad26, PF ([place]) 07/18/2021    Hepatitis A, Adult 10/05/2021    Hepatitis B, Adult 10/05/2021    Influenza - Quadrivalent 11/05/2021    Meningococcal Conjugate (MCV4P) 03/22/2022    Pneumococcal Conjugate - 13 Valent 03/22/2022    Tdap 11/05/2021        ROS       Objective:      /78   Pulse 83   Temp 98 °F (36.7 °C)   Resp 16   Ht 6' 1" (1.854 m)   Wt 126.6 kg (279 lb)   BMI 36.81 kg/m²      Physical Exam     Labs:   Lab Results   Component Value Date    WBC 5.5 12/30/2021 "    HGB 14.8 12/30/2021    HCT 44.2 12/30/2021    MCV 91.7 12/30/2021     12/30/2021       CMP  Sodium Level   Date Value Ref Range Status   12/30/2021 141 136 - 145 mmol/L Final     Potassium Level   Date Value Ref Range Status   12/30/2021 3.8 3.5 - 5.1 mmol/L Final     Carbon Dioxide   Date Value Ref Range Status   12/30/2021 24 22 - 29 mmol/L Final     Blood Urea Nitrogen   Date Value Ref Range Status   12/30/2021 15.3 8.9 - 20.6 mg/dL Final     Creatinine   Date Value Ref Range Status   12/30/2021 1.67 (H) 0.73 - 1.18 mg/dL Final     Calcium Level Total   Date Value Ref Range Status   12/30/2021 9.4 8.7 - 10.5 mg/dL Final     Albumin Level   Date Value Ref Range Status   10/05/2021 4.1 3.5 - 5.0 gm/dL Final     Bilirubin Total   Date Value Ref Range Status   10/05/2021 0.2 <<=1.5 mg/dL Final     Alkaline Phosphatase   Date Value Ref Range Status   10/05/2021 96 40 - 150 unit/L Final     Aspartate Aminotransferase   Date Value Ref Range Status   10/05/2021 36 (H) 5 - 34 unit/L Final     Alanine Aminotransferase   Date Value Ref Range Status   10/05/2021 47 0 - 55 unit/L Final     Lab Results   Component Value Date    TSH 1.7595 10/05/2021     Hep C Ab Interp   Date Value Ref Range Status   10/05/2021 Nonreactive >Nonreactive Final     Syphilis Antibody   Date Value Ref Range Status   10/05/2021 Reactive (A) >Nonreactive Final     RPR   Date Value Ref Range Status   10/05/2021 Reactive (A) >Non-Reactive Final     Cholesterol Total   Date Value Ref Range Status   11/05/2021 224 (H) <<=200 mg/dL Final     HDL Cholesterol   Date Value Ref Range Status   11/05/2021 33 (L) 35 - 60 mg/dL Final     Triglyceride   Date Value Ref Range Status   11/05/2021 273 (H) 34 - 140 mg/dL Final     Cholesterol/HDL Ratio   Date Value Ref Range Status   11/05/2021 7 (H) 0 - 5 Final     Very Low Density Lipoprotein   Date Value Ref Range Status   11/05/2021 55  Final     LDL Cholesterol   Date Value Ref Range Status   11/05/2021  136.00 50.00 - 140.00 mg/dL Final     Vit D 25 OH   Date Value Ref Range Status   10/05/2021 20.6 (L) 30.0 - 80.0 ng/mL Final     Results for orders placed or performed in visit on 10/05/21   CD4 Lymphocytes   Result Value Ref Range    Lymph Absolute N/A 1,260 - 5,520 unit/L    CD4 % 47.0 31.0 - 59.0 %    CD4 Absolute 822 589 - 1,505 unit/L    WBC Absolute N/A 4,500.0 - 11,500.0 /mm3    Lymph Percent N/A 28 - 48 %     No results found for this or any previous visit.  No results found for this or any previous visit.  No results found for this or any previous visit.  No results found for this or any previous visit.    Imaging: Reviewed most recent relevant imaging studies available, notable results highlighted in this note    Medications:     Current Outpatient Medications   Medication Instructions    beclomethasone dipropionate (QVAR REDIHALER) 40 mcg/actuation HFAB Inhalation    cetirizine (ZYRTEC) 10 mg, Oral    cholecalciferol (vitamin D3) (VITAMIN D3) 50 mcg, Oral    TRIUMEQ 600- mg Tab TAKE ONE TABLET BY MOUTH daily AT bedtime       Assessment:       Problem List Items Addressed This Visit    None  Visit Diagnoses       HIV disease    -  Primary    Relevant Orders    Lipid Panel    Quantiferon Gold TB    SYPHILIS ANTIBODY (WITH REFLEX RPR)    TSH    Urinalysis    Hepatitis C Antibody    Chlamydia/GC, PCR    Comprehensive Metabolic Panel    CBC Auto Differential    CD4 Lymphocytes    HIV-1 RNA, Quantitative, PCR with Reflex to Genotype    Vitamin D deficiency        Relevant Orders    Vitamin D    History of syphilis        Relevant Orders    SYPHILIS ANTIBODY (WITH REFLEX RPR)               Plan:      HIV disease  -     Lipid Panel; Future; Expected date: 11/13/2023  -     Quantiferon Gold TB; Future; Expected date: 11/13/2023  -     SYPHILIS ANTIBODY (WITH REFLEX RPR); Future; Expected date: 11/13/2023  -     TSH; Future; Expected date: 11/13/2023  -     Urinalysis; Future; Expected date: 11/13/2023  -      Hepatitis C Antibody; Future; Expected date: 11/13/2023  -     Chlamydia/GC, PCR; Future; Expected date: 11/13/2023  -     Comprehensive Metabolic Panel; Future; Expected date: 11/13/2023  -     CBC Auto Differential; Future; Expected date: 11/13/2023  -     CD4 Lymphocytes; Future; Expected date: 11/13/2023  -     HIV-1 RNA, Quantitative, PCR with Reflex to Genotype; Future; Expected date: 11/13/2023    Vitamin D deficiency  -     Vitamin D; Future; Expected date: 11/13/2023    History of syphilis  -     SYPHILIS ANTIBODY (WITH REFLEX RPR); Future; Expected date: 11/13/2023         *** minutes of total time spent on the encounter, which includes face to face time and non-face to face time preparing to see the patient (eg, review of tests), Obtaining and/or reviewing separately obtained history, Documenting clinical information in the electronic or other health record, Independently interpreting results (not separately reported) and communicating results to the patient/family/caregiver, or Care coordination (not separately reported).

## 2023-11-13 NOTE — TELEPHONE ENCOUNTER
Linda (nurse-[facility] ) informed of results and providers recommendations. All questions answered. Also faxed this note, rx and appts to Linda (Maribell also informed).

## 2023-11-13 NOTE — TELEPHONE ENCOUNTER
----- Message from KHALIDA Manrique sent at 11/13/2023  2:07 PM CST -----  Reviewed labs. RPR 16 dils, Chol 224 and . Pt is a inmate at [facility].  He will need to be retreated for syphilis with Doxycycline 100 mg 1 po BID x 28 days and he will need to start Pravastatin 40 mg 1 po q day for elevated cholesterol and TG as well as follow a low cholesterol diet. I will print rx for you to send.  Please notify medical or Maribell.

## 2023-11-14 LAB
C TRACH RRNA SPEC QL NAA+PROBE: NEGATIVE
N GONORRHOEA RRNA SPEC QL NAA+PROBE: NEGATIVE
SPECIMEN SOURCE: NORMAL
SPECIMEN SOURCE: NORMAL

## 2023-11-15 LAB
CD3 CELLS # BLD: 1386 CELLS/MCL (ref 550–2202)
CD3 CELLS NFR BLD: 76 % (ref 58–86)
CD3+CD4+ CELLS # BLD: 814 CELLS/MCL (ref 365–1437)
CD3+CD4+ CELLS NFR BLD: 45 % (ref 32–64)
CD3+CD4+ CELLS/CD3+CD8+ CLL BLD: 1.5 %
CD3+CD8+ CELLS # BLD: 557 CELLS/MCL (ref 145–846)
CD3+CD8+ CELLS NFR BLD: 31 % (ref 13–40)
CD45 CELLS # BLD: 1.82 THOU/MCL (ref 0.82–2.84)
GAMMA INTERFERON BACKGROUND BLD IA-ACNC: 0.2 IU/ML
HIV1 RNA # PLAS NAA DL=20: 700 COPIES/ML
M TB IFN-G BLD-IMP: NEGATIVE
M TB IFN-G CD4+ BCKGRND COR BLD-ACNC: -0.03 IU/ML
M TB IFN-G CD4+CD8+ BCKGRND COR BLD-ACNC: -0.04 IU/ML
MITOGEN IGNF BCKGRD COR BLD-ACNC: 9.8 IU/ML

## 2023-11-22 ENCOUNTER — TELEPHONE (OUTPATIENT)
Dept: INFECTIOUS DISEASES | Facility: CLINIC | Age: 38
End: 2023-11-22
Payer: COMMERCIAL

## 2023-11-22 DIAGNOSIS — A53.0 LATENT SYPHILIS: Primary | ICD-10-CM

## 2023-11-22 NOTE — TELEPHONE ENCOUNTER
----- Message from Alea Vera sent at 11/22/2023 12:05 PM CST -----  Patient of Akiko Mckeon, with [facility], calling requesting to speak with nurse    Patient was recently seen and placed on antibiotics (doxycycline) . Patient is currently having side     effects and would like to know if medication could be changed.    [phone]

## 2023-11-22 NOTE — TELEPHONE ENCOUNTER
RPR previously 8 dils on 3/2022. Currently 16 dils. Pt will need Bicillin LA 2.4 million units weekly x 3 weeks.

## 2023-11-22 NOTE — TELEPHONE ENCOUNTER
Spoke with Linda she said pt c/o upset stomach and diarrhea after taking Doxycycline, pt refused med on yesterday and today. Please advise

## 2023-11-22 NOTE — TELEPHONE ENCOUNTER
Pt will need to be treated with either Bicillin LA 2.4 million units weekly x 3 or Doxycycline 100 mg 1 po BID x 28 days with food.  Please notify [facility].

## 2023-11-22 NOTE — TELEPHONE ENCOUNTER
Spoke to Linda she asked if we can fax a rx of Bicillin and she will have it ordered and give it there.

## 2024-04-10 DIAGNOSIS — B20 HIV DISEASE: ICD-10-CM

## 2024-04-10 RX ORDER — ABACAVIR SULFATE, DOLUTEGRAVIR SODIUM, LAMIVUDINE 600; 50; 300 MG/1; MG/1; MG/1
1 TABLET, FILM COATED ORAL NIGHTLY
Qty: 30 TABLET | Refills: 0 | Status: SHIPPED | OUTPATIENT
Start: 2024-04-10